# Patient Record
Sex: FEMALE | Race: WHITE | NOT HISPANIC OR LATINO | Employment: UNEMPLOYED | URBAN - METROPOLITAN AREA
[De-identification: names, ages, dates, MRNs, and addresses within clinical notes are randomized per-mention and may not be internally consistent; named-entity substitution may affect disease eponyms.]

---

## 2023-01-06 DIAGNOSIS — U07.1 COVID-19 VIRUS DETECTED: ICD-10-CM

## 2023-01-17 ENCOUNTER — OFFICE VISIT (OUTPATIENT)
Dept: PRIMARY CARE CLINIC | Facility: CLINIC | Age: 83
End: 2023-01-17

## 2023-01-17 VITALS
SYSTOLIC BLOOD PRESSURE: 100 MMHG | WEIGHT: 131.38 LBS | BODY MASS INDEX: 20.62 KG/M2 | OXYGEN SATURATION: 97 % | HEIGHT: 67 IN | DIASTOLIC BLOOD PRESSURE: 60 MMHG | RESPIRATION RATE: 18 BRPM | HEART RATE: 84 BPM | TEMPERATURE: 96 F

## 2023-01-17 DIAGNOSIS — R53.83 LETHARGY: ICD-10-CM

## 2023-01-17 DIAGNOSIS — E78.00 PURE HYPERCHOLESTEROLEMIA: ICD-10-CM

## 2023-01-17 DIAGNOSIS — R19.7 DIARRHEA, UNSPECIFIED TYPE: ICD-10-CM

## 2023-01-17 DIAGNOSIS — G43.009 MIGRAINE WITHOUT AURA AND WITHOUT STATUS MIGRAINOSUS, NOT INTRACTABLE: ICD-10-CM

## 2023-01-17 DIAGNOSIS — I10 HYPERTENSION, UNSPECIFIED TYPE: ICD-10-CM

## 2023-01-17 DIAGNOSIS — R25.1 TREMOR: ICD-10-CM

## 2023-01-17 DIAGNOSIS — R63.4 WEIGHT LOSS: ICD-10-CM

## 2023-01-17 DIAGNOSIS — J18.9 PNEUMONIA DUE TO INFECTIOUS ORGANISM, UNSPECIFIED LATERALITY, UNSPECIFIED PART OF LUNG: Primary | ICD-10-CM

## 2023-01-17 PROCEDURE — 99214 OFFICE O/P EST MOD 30 MIN: CPT | Mod: PBBFAC,PN | Performed by: FAMILY MEDICINE

## 2023-01-17 PROCEDURE — 93010 EKG 12-LEAD: ICD-10-PCS | Mod: S$PBB,,, | Performed by: INTERNAL MEDICINE

## 2023-01-17 PROCEDURE — 93005 ELECTROCARDIOGRAM TRACING: CPT | Mod: PBBFAC,PN | Performed by: INTERNAL MEDICINE

## 2023-01-17 PROCEDURE — 96372 THER/PROPH/DIAG INJ SC/IM: CPT | Mod: PBBFAC,PN

## 2023-01-17 PROCEDURE — 99214 PR OFFICE/OUTPT VISIT, EST, LEVL IV, 30-39 MIN: ICD-10-PCS | Mod: S$PBB,,, | Performed by: FAMILY MEDICINE

## 2023-01-17 PROCEDURE — 99999 PR PBB SHADOW E&M-EST. PATIENT-LVL IV: ICD-10-PCS | Mod: PBBFAC,,, | Performed by: FAMILY MEDICINE

## 2023-01-17 PROCEDURE — 93010 ELECTROCARDIOGRAM REPORT: CPT | Mod: S$PBB,,, | Performed by: INTERNAL MEDICINE

## 2023-01-17 PROCEDURE — 99999 PR PBB SHADOW E&M-EST. PATIENT-LVL IV: CPT | Mod: PBBFAC,,, | Performed by: FAMILY MEDICINE

## 2023-01-17 PROCEDURE — 99214 OFFICE O/P EST MOD 30 MIN: CPT | Mod: S$PBB,,, | Performed by: FAMILY MEDICINE

## 2023-01-17 RX ORDER — FLUCONAZOLE 150 MG/1
150 TABLET ORAL ONCE
Qty: 1 TABLET | Refills: 0 | Status: SHIPPED | OUTPATIENT
Start: 2023-01-17 | End: 2023-01-17

## 2023-01-17 RX ORDER — CYANOCOBALAMIN 1000 UG/ML
1000 INJECTION, SOLUTION INTRAMUSCULAR; SUBCUTANEOUS ONCE
Status: COMPLETED | OUTPATIENT
Start: 2023-01-17 | End: 2023-01-17

## 2023-01-17 RX ORDER — NYSTATIN 100000 [USP'U]/ML
5 SUSPENSION ORAL 4 TIMES DAILY
Qty: 200 ML | Refills: 0 | Status: SHIPPED | OUTPATIENT
Start: 2023-01-17 | End: 2023-01-27

## 2023-01-17 RX ADMIN — CYANOCOBALAMIN 1000 MCG: 1000 INJECTION, SOLUTION INTRAMUSCULAR at 04:01

## 2023-01-17 NOTE — PROGRESS NOTES
Subjective:       Patient ID: Elodia Gonzalez is a 82 y.o. female.    Chief Complaint: Follow-up (ER F/u ), Sore Throat, Mouth Lesions, Fatigue, and Cough    HPI: 83 yo WF --sick since January 1, 2023---no fever--+runny nose-yesterday--+sore throat--+cough---phlegm--was black no clear  Patient was diagnosed with strep COVID and pneumonia--01/06/2023-=-treated with Z-Willy--ceftin .  Throat started hurting again yesterday--still weak--eating better--headache gone--cough slightly better.  Tired but can't  sleep.  Has lesions on her tongue  Patient was given albuterol in emergency room inhaler  Leaving in 2 weeks   Probiotic     ROS:  Skin: no psoriasis, eczema, skin cancer  HEENT:  History migraine headache, no  ocular pain, blurred vision, diplopia, epistaxis, + all way hoarseness +change in voice, no  thyroid trouble  Lung: +pneumonia, no asthma, Tb, wheezing, SOB, no smoking little bit of chest pain in the epigastric pain in midsternal area  Heart: No chest pain, ankle edema, palpitations, MI, +ilbby murmur,+ hypertension,  +hyperlipidemia--no stent bypass arrhythmia  Abdomen:  History diverticulosis No nausea, vomiting, +diarrhea since yesterday,no  constipation, no ulcers, hepatitis, gallbladder disease, melena, hematochezia, hematemesis  : no UTI, renal disease, stones  MS: no fractures, O/A, lupus, rheumatoid, gout  Neuro: No dizziness, LOC, seizures   No diabetes, no anemia, no anxiety, no depression     Objective:   Physical Exam:  General: Well nourished, well developed, no acute distress  Skin: No lesions  HEENT: Eyes PERRLA, EOM intact, nose patent, throat non-erythematous   NECK: Supple, no bruits, No JVD, no nodes  Lungs: Clear, no rales, rhonchi, wheezing  Heart: Regular rate and rhythm, no murmurs, gallops, or rubs  Abdomen: flat, bowel sounds positive, no tenderness, or organomegaly  MS: Range of motion and muscle strength intact  Neuro: Alert, CN intact, oriented X 3  Extremities: No cyanosis,  clubbing, or edema         Assessment:       1. Pneumonia due to infectious organism, unspecified laterality, unspecified part of lung    2. Lethargy    3. Weight loss    4. Diarrhea, unspecified type    5. Hypertension, unspecified type    6. Pure hypercholesterolemia    7. Migraine without aura and without status migrainosus, not intractable    8. Tremor        Plan:       Pneumonia due to infectious organism, unspecified laterality, unspecified part of lung  -     X-Ray Chest PA And Lateral; Future; Expected date: 01/17/2023  -     EKG 12-lead; Future    Lethargy    Weight loss    Diarrhea, unspecified type    Hypertension, unspecified type    Pure hypercholesterolemia  -     CBC Auto Differential; Future; Expected date: 01/17/2023  -     Comprehensive Metabolic Panel; Future; Expected date: 01/17/2023  -     Sedimentation rate; Future; Expected date: 01/17/2023  -     C-reactive protein; Future; Expected date: 01/17/2023  -     X-Ray Chest PA And Lateral; Future; Expected date: 01/17/2023  -     EKG 12-lead; Future    Migraine without aura and without status migrainosus, not intractable    Tremor    Other orders  -     nystatin (MYCOSTATIN) 100,000 unit/mL suspension; Take 5 mLs (500,000 Units total) by mouth 4 (four) times daily. for 10 days  Dispense: 200 mL; Refill: 0  -     fluconazole (DIFLUCAN) 150 MG Tab; Take 1 tablet (150 mg total) by mouth once. for 1 dose  Dispense: 1 tablet; Refill: 0  -     cyanocobalamin injection 1,000 mcg        Recent pneumonia--with bilateral pleural effusion--anemia---patient treated with Zithromax and Ceftin--on steroids  Now with lethargy--decreased appetite--diarrhea--persistent sinus and cough--CBCs CMP CRP sed rate chest x-ray EKG--  Patient with occasional PVCs---chest x-ray in emergency room showed pneumonia need to recheck to be sure pneumonia has resolved or at least not gotten worse.  Lab work due to patient being anemic lethargic with decreased after--check  electrolytes with diarrhea.  And patient has lost weight  Chest pain--appears to be possible esophagitis  Sore tongue--with lesions felt to be possible thrush will treat with nystatin Diflucan  History migraine headache on Maxalt  Hypertension on Zebeta  Hyperlipidemia on Zocor  Tremor of the head familial tremor  Lab as above CBCs CMP sed rate CRP chest x-ray EKG-patient with pneumonia so needs to have workup repeat especially if patient is going to fly back to [place]--in with symptoms of lethargy loss of appetite weight loss  Health maintenance will not be done as patient is from [place]

## 2023-01-17 NOTE — PROGRESS NOTES
Verified pt ID using name and . NDKA. Administered B12 1,000mcg in right dorsagluteal per physician order using aseptic technique. Aspirated and no blood return noted. Pt tolerated well with no adverse reactions noted.

## 2023-01-25 ENCOUNTER — TELEPHONE (OUTPATIENT)
Dept: PRIMARY CARE CLINIC | Facility: CLINIC | Age: 83
End: 2023-01-25

## 2023-01-25 NOTE — TELEPHONE ENCOUNTER
----- Message from Delisa Arzola sent at 1/25/2023  2:49 PM CST -----  Contact: [phone] Patient  Patient is returning a phone call.  Who left a message for the patient: Sveta  Does patient know what this is regarding:  yes  Would you like a call back, or a response through your [facility] portal?:   Call Back Please  Comments:

## 2023-01-25 NOTE — TELEPHONE ENCOUNTER
----- Message from Katey Duran sent at 1/25/2023 12:31 PM CST -----  Regarding: Results  Contact: [phone]  Patients daughter Ilda  is calling. Patient had an xray and labs done on 1/21. Calling to get results. Please call [phone]    Thank You

## 2023-01-26 DIAGNOSIS — Z78.0 MENOPAUSE: ICD-10-CM

## 2023-01-31 ENCOUNTER — OFFICE VISIT (OUTPATIENT)
Dept: PRIMARY CARE CLINIC | Facility: CLINIC | Age: 83
End: 2023-01-31

## 2023-01-31 VITALS
OXYGEN SATURATION: 97 % | HEIGHT: 67 IN | WEIGHT: 135.94 LBS | DIASTOLIC BLOOD PRESSURE: 70 MMHG | RESPIRATION RATE: 18 BRPM | TEMPERATURE: 97 F | SYSTOLIC BLOOD PRESSURE: 90 MMHG | BODY MASS INDEX: 21.34 KG/M2 | HEART RATE: 85 BPM

## 2023-01-31 DIAGNOSIS — G43.009 MIGRAINE WITHOUT AURA AND WITHOUT STATUS MIGRAINOSUS, NOT INTRACTABLE: ICD-10-CM
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
## 2023-01-31 DIAGNOSIS — O04.88: ICD-10-CM

## 2023-01-31 DIAGNOSIS — I10 HYPERTENSION, UNSPECIFIED TYPE: Primary | ICD-10-CM

## 2023-01-31 DIAGNOSIS — E78.00 PURE HYPERCHOLESTEROLEMIA: ICD-10-CM

## 2023-01-31 LAB
BILIRUB SERPL-MCNC: ABNORMAL MG/DL
BLOOD URINE, POC: ABNORMAL
CLARITY, POC UA: ABNORMAL
COLOR, POC UA: ABNORMAL
GLUCOSE UR QL STRIP: NEGATIVE
KETONES UR QL STRIP: NEGATIVE
LEUKOCYTE ESTERASE URINE, POC: ABNORMAL
NITRITE, POC UA: POSITIVE
PH, POC UA: 5
PROTEIN, POC: ABNORMAL
SPECIFIC GRAVITY, POC UA: 1.02
UROBILINOGEN, POC UA: NEGATIVE

## 2023-01-31 PROCEDURE — 99213 OFFICE O/P EST LOW 20 MIN: CPT | Mod: PBBFAC,PN | Performed by: FAMILY MEDICINE

## 2023-01-31 PROCEDURE — 81002 URINALYSIS NONAUTO W/O SCOPE: CPT | Mod: PBBFAC,PN | Performed by: FAMILY MEDICINE

## 2023-01-31 PROCEDURE — 99214 PR OFFICE/OUTPT VISIT, EST, LEVL IV, 30-39 MIN: ICD-10-PCS | Mod: S$PBB,,, | Performed by: FAMILY MEDICINE

## 2023-01-31 PROCEDURE — 99999 PR PBB SHADOW E&M-EST. PATIENT-LVL III: CPT | Mod: PBBFAC,,, | Performed by: FAMILY MEDICINE

## 2023-01-31 PROCEDURE — 99214 OFFICE O/P EST MOD 30 MIN: CPT | Mod: S$PBB,,, | Performed by: FAMILY MEDICINE

## 2023-01-31 PROCEDURE — 99999 PR PBB SHADOW E&M-EST. PATIENT-LVL III: ICD-10-PCS | Mod: PBBFAC,,, | Performed by: FAMILY MEDICINE

## 2023-01-31 RX ORDER — PHENAZOPYRIDINE HYDROCHLORIDE 100 MG/1
100 TABLET, FILM COATED ORAL 3 TIMES DAILY PRN
Qty: 15 TABLET | Refills: 0 | Status: SHIPPED | OUTPATIENT
Start: 2023-01-31 | End: 2023-02-10

## 2023-01-31 RX ORDER — CIPROFLOXACIN 250 MG/1
250 TABLET, FILM COATED ORAL 2 TIMES DAILY
Qty: 20 TABLET | Refills: 0 | Status: SHIPPED | OUTPATIENT
Start: 2023-01-31

## 2023-01-31 NOTE — PROGRESS NOTES
Subjective:       Patient ID: Elodia Gonzalez is a 82 y.o. female.    Chief Complaint: Urinary Tract Infection    HPI: 81 yo WF --c/o UTI--hx recent covid --still with some residual symptoms --tired after bathing--unable walk by herself --fell 2 weeks ago--fell backwards and landed on a rug.  For COVID was ambulating well--?? Cane --rollator walker.  Missed flight to [place] was today rebooked for 3 weeks from now hopefully stonger Pt traveling by herself .      ??UTI --started last Tuesday 1 week ago--took AB [facility] CAMPOS gave her --pt nwhernandez chronic UTI's took AB x 3 days no relief. Pivmelam 400 mg--Flimtabbletten--daughter googled was type Penicillin ??augmentin         Recently btxed with zithromax and ceftin        UTI's at least 3 x year       U/A positive nitrates/large leukocytes/large blood/some protein      ROS:  Skin: no psoriasis, eczema, skin cancer  HEENT:  History migraine headache, no  ocular pain, blurred vision, diplopia, epistaxis, no hoarseness no change in voice, no  thyroid trouble  Lung: +pneumonia, no asthma, Tb, wheezing, SOB, no smoking   Heart: No chest pain, ankle edema, palpitations, MI, +libby murmur,+ hypertension,  +hyperlipidemia--no stent bypass arrhythmia  Abdomen:  History diverticulosis No nausea, vomiting, no diarrhea ,no  constipation, no ulcers, hepatitis, gallbladder disease, melena, hematochezia, hematemesis  : no UTI, renal disease, stones--recurrent UTI's 3-4 per year has AB from [facility] in [place] to take whenever symptoms occur  MS: no fractures, O/A, lupus, rheumatoid, gout  Neuro: No dizziness, LOC, seizures did have fall   No diabetes, no anemia, no anxiety, no depression     Objective:   Physical Exam:  General: Well nourished, well developed, no acute distress  Skin: No lesions  HEENT: Eyes PERRLA, EOM intact, nose patent, throat non-erythematous   NECK: Supple, no bruits, No JVD, no nodes  Lungs: Clear, no rales, rhonchi, wheezing  Heart: Regular rate and rhythm, no  murmurs, gallops, or rubs  Abdomen: flat, bowel sounds positive, no tenderness, or organomegaly  MS: Range of motion and muscle strength intact  Neuro: Alert, CN intact, oriented X 3 Some swaying on rhomberg and heel toe   Extremities: No cyanosis, clubbing, or edema         Assessment:       1. Hypertension, unspecified type    2. UTI (urinary tract infection) following induced pregnancy termination    3. Fall, initial encounter    4. Pure hypercholesterolemia    5. Migraine without aura and without status migrainosus, not intractable          Plan:       Hypertension, unspecified type    UTI (urinary tract infection) following induced pregnancy termination    Fall, initial encounter    Pure hypercholesterolemia    Migraine without aura and without status migrainosus, not intractable    Other orders  -     ciprofloxacin HCl (CIPRO) 250 MG tablet; Take 1 tablet (250 mg total) by mouth 2 (two) times daily.  Dispense: 20 tablet; Refill: 0  -     phenazopyridine (PYRIDIUM) 100 MG tablet; Take 1 tablet (100 mg total) by mouth 3 (three) times daily as needed for Pain.  Dispense: 15 tablet; Refill: 0          Main Reason for Visit  UTI--history recurrent UTIs 3-4 per year--sees urologist in [place]--recently took Augmentin--will give Cipro 250 mg 1 p.o. b.i.d. times 10 days with peridium 100 mg 1 p.o. t.i.d. times 5 days  Recent COVID infection with bronchitis and  recent pneumonia--with bilateral pleural effusion--anemia---patient treated with Zithromax and Ceftin--on steroids--repeat chest x-ray showed bronchitis  Recent fall--daughter states patient has some problems with disequilibrium and has had falls in the past--suggested Rollator walker doubt patient would be assisted with a cane patient to fly back to [place] in 3 weeks daughter will get Rollator walker there  Needs note for airplane the patient was unable to fly today due to UTI--weakness--falling spell  Chest pain--appears to be possible esophagitis  History  migraine headache on Maxalt  Hypertension on Zebeta  Hyperlipidemia on Zocor  Tremor of the head familial tremor  Lab recent CBC CMP sed rate CRP all basically within normal limits  Health maintenance lipid bone density--no workup will be done due to patient being in [place]

## 2023-02-10 PROBLEM — N30.00 ACUTE CYSTITIS WITHOUT HEMATURIA: Status: ACTIVE | Noted: 2023-01-31

## 2023-04-24 PROBLEM — J18.9 PNEUMONIA DUE TO INFECTIOUS ORGANISM: Status: RESOLVED | Noted: 2023-01-17 | Resolved: 2023-04-24

## 2024-01-31 DIAGNOSIS — Z78.0 MENOPAUSE: ICD-10-CM
